# Patient Record
Sex: FEMALE | Race: BLACK OR AFRICAN AMERICAN | Employment: FULL TIME | URBAN - METROPOLITAN AREA
[De-identification: names, ages, dates, MRNs, and addresses within clinical notes are randomized per-mention and may not be internally consistent; named-entity substitution may affect disease eponyms.]

---

## 2019-04-03 ENCOUNTER — HOSPITAL ENCOUNTER (EMERGENCY)
Age: 34
Discharge: HOME | End: 2019-04-03
Attending: EMERGENCY MEDICINE
Payer: SELF-PAY

## 2019-04-03 VITALS
BODY MASS INDEX: 39.37 KG/M2 | DIASTOLIC BLOOD PRESSURE: 92 MMHG | HEIGHT: 66 IN | TEMPERATURE: 98.4 F | RESPIRATION RATE: 18 BRPM | SYSTOLIC BLOOD PRESSURE: 156 MMHG | WEIGHT: 245 LBS | OXYGEN SATURATION: 100 % | HEART RATE: 85 BPM

## 2019-04-03 DIAGNOSIS — Q18.1 CYST OF EAR CANAL: Primary | ICD-10-CM

## 2019-04-03 PROCEDURE — 75810000218 HC FINE NEEDLE ASPIRATION: Performed by: EMERGENCY MEDICINE

## 2019-04-03 PROCEDURE — 99283 EMERGENCY DEPT VISIT LOW MDM: CPT | Performed by: EMERGENCY MEDICINE

## 2019-04-03 PROCEDURE — 75810000139 HC PUNCT ASPIRATION ABCESS: Performed by: EMERGENCY MEDICINE

## 2019-04-03 RX ORDER — TRAMADOL HYDROCHLORIDE 50 MG/1
50 TABLET ORAL
Qty: 18 TAB | Refills: 0 | Status: SHIPPED | OUTPATIENT
Start: 2019-04-03 | End: 2019-04-06

## 2019-04-03 NOTE — ED TRIAGE NOTES
Pt states 4 days ago her hearing started feel muffled. States if you touch on her right outer hear it is tender. States she had tried to clean her ear with some peroxide but it did not help. Denies fever. Denies nausea or vomiting.

## 2019-04-04 NOTE — ED NOTES
I have reviewed discharge instructions with the patient. The patient verbalized understanding. Patient left ED via Discharge Method: ambulatory to Home with mother. Opportunity for questions and clarification provided. Patient given 1 scripts. To continue your aftercare when you leave the hospital, you may receive an automated call from our care team to check in on how you are doing. This is a free service and part of our promise to provide the best care and service to meet your aftercare needs.  If you have questions, or wish to unsubscribe from this service please call [phone]. Thank you for Choosing our [facility] Emergency Department.

## 2019-04-04 NOTE — ED PROVIDER NOTES
Presents with complaint of muffled hearing and pain and a bump in her left ear canal. 
 
The history is provided by the patient. Ear Pain This is a new problem. The current episode started more than 2 days ago. The problem occurs constantly. The problem has been gradually worsening. Patient complains that the left ear is affected. There has been no fever. The pain is moderate. Associated symptoms include hearing loss. Pertinent negatives include no ear discharge, no headaches, no rhinorrhea, no sore throat and no cough. No past medical history on file. No past surgical history on file. No family history on file. Social History Socioeconomic History  Marital status: SINGLE Spouse name: Not on file  Number of children: Not on file  Years of education: Not on file  Highest education level: Not on file Occupational History  Not on file Social Needs  Financial resource strain: Not on file  Food insecurity:  
  Worry: Not on file Inability: Not on file  Transportation needs:  
  Medical: Not on file Non-medical: Not on file Tobacco Use  Smoking status: Never Smoker  Smokeless tobacco: Never Used Substance and Sexual Activity  Alcohol use: No  
 Drug use: No  
 Sexual activity: Yes Lifestyle  Physical activity:  
  Days per week: Not on file Minutes per session: Not on file  Stress: Not on file Relationships  Social connections:  
  Talks on phone: Not on file Gets together: Not on file Attends Shinto service: Not on file Active member of club or organization: Not on file Attends meetings of clubs or organizations: Not on file Relationship status: Not on file  Intimate partner violence:  
  Fear of current or ex partner: Not on file Emotionally abused: Not on file Physically abused: Not on file Forced sexual activity: Not on file Other Topics Concern  Not on file Social History Narrative  Not on file ALLERGIES: Patient has no known allergies. Review of Systems HENT: Positive for ear pain and hearing loss. Negative for ear discharge, rhinorrhea and sore throat. Respiratory: Negative for cough. Neurological: Negative for headaches. All other systems reviewed and are negative. Vitals:  
 04/03/19 1918 04/03/19 2102 BP: (!) 161/100 (!) 156/92 Pulse: 91 85 Resp: 18 18 Temp: 98.2 °F (36.8 °C) 98.4 °F (36.9 °C) SpO2: 100% 100% Weight: 111.1 kg (245 lb) Height: 5' 6\" (1.676 m) Physical Exam  
Constitutional: She is oriented to person, place, and time. She appears well-developed and well-nourished. No distress. HENT:  
Head: Normocephalic and atraumatic. Proximal Left ear canal with a pencil eraser sized cystic mass Musculoskeletal: Normal range of motion. Neurological: She is alert and oriented to person, place, and time. No cranial nerve deficit. Skin: Skin is warm and dry. She is not diaphoretic. Psychiatric: She has a normal mood and affect. Her behavior is normal.  
Nursing note and vitals reviewed. MDM Number of Diagnoses or Management Options Cyst of ear canal:  
Diagnosis management comments: Needle I&D with some drainage, pt tolerated well, warm compresses and refer to ENT. Likely sebaceous cyst.   
 
Risk of Complications, Morbidity, and/or Mortality Presenting problems: low Diagnostic procedures: low Management options: low Patient Progress Patient progress: stable I&D Abcess Simple Date/Time: 4/3/2019 11:26 PM 
Performed by: Cheyenne Tineo MD 
Authorized by: Cheyenne Tineo MD  
 
Consent:  
  Consent obtained:  Verbal 
  Consent given by:  Patient Risks discussed:  Bleeding, incomplete drainage and infection Alternatives discussed:  No treatment Location:  
  Type:  Cyst 
  Size:  5 mm Location:  Head   Head location:  L external auditory canal 
 Pre-procedure details:  
  Skin preparation:  Antiseptic wash Anesthesia (see MAR for exact dosages): Anesthesia method:  None Procedure type:  
  Complexity:  Simple Procedure details:  
  Needle aspiration: yes Needle size:  18 G Drainage:  Purulent and bloody Drainage amount:  Scant Wound treatment:  Wound left open Packing materials:  None Post-procedure details:  
  Patient tolerance of procedure: Tolerated well, no immediate complications

## 2019-04-04 NOTE — DISCHARGE INSTRUCTIONS
Patient Education        Skin Cyst in Children: Care Instructions  Overview    A skin cyst is a lump just under the skin. These cysts can form when a hair follicle becomes blocked. They are common in acne and may occur on the face, neck, back, and genitals. But they can form anywhere on the body. These cysts aren't cancer, and they don't lead to cancer. They tend not to hurt, but they can sometimes become swollen and painful. They also may break open (rupture) and cause scarring. These cysts may not cause problems. They may not need treatment. If your child has a cyst that is swollen and hurts, the doctor may inject it with a medicine or treat it with antibiotics if it's infected. But sometimes a painful or infected cyst will need to be removed or opened. The doctor will give your child a shot of numbing medicine and then will cut into the cyst to drain it or remove it. Follow-up care is a key part of your child's treatment and safety. Be sure to make and go to all appointments, and call your doctor if your child is having problems. It's also a good idea to know your child's test results and keep a list of the medicines your child takes. How can you care for your child at home? · Don't squeeze the skin cyst or poke it with a needle to open it. This can cause swelling, redness, and infection. · Keep the area clean with soap and water. After a cyst is removed  · If your doctor told you how to care for your child's wound, follow your doctor's instructions. If you did not get instructions, follow this general advice for wounds without stitches:  ? Keep the wound bandaged and dry for the first day. ? After the first day, wash around the wound with clean water 2 times a day. Don't use hydrogen peroxide or alcohol, which can slow healing. · If your child has stitches, you may get other instructions. You will have to come back to have the stitches removed. When should you call for help?   Call your doctor now or seek immediate medical care if:    · Your child has signs of infection, such as:  ? Increased pain, swelling, warmth, or redness. ? Red streaks leading from the area. ? Pus draining from the area. ? A fever.    Watch closely for changes in your child's health, and be sure to contact your doctor if:    · The cyst is growing or changing.     · Your child does not get better as expected. Where can you learn more? Go to http://milvia-rocky.info/. Enter W690 in the search box to learn more about \"Skin Cyst in Children: Care Instructions. \"  Current as of: April 17, 2018  Content Version: 11.9  © 9973-9648 [employer]. Care instructions adapted under license by [employer] (which disclaims liability or warranty for this information). If you have questions about a medical condition or this instruction, always ask your healthcare professional. [address] any warranty or liability for your use of this information.

## 2022-11-26 ENCOUNTER — HOSPITAL ENCOUNTER (EMERGENCY)
Age: 37
Discharge: HOME | End: 2022-11-26
Attending: EMERGENCY MEDICINE

## 2022-11-26 VITALS
TEMPERATURE: 99 F | BODY MASS INDEX: 38.57 KG/M2 | HEIGHT: 66 IN | OXYGEN SATURATION: 99 % | SYSTOLIC BLOOD PRESSURE: 187 MMHG | WEIGHT: 240 LBS | DIASTOLIC BLOOD PRESSURE: 121 MMHG | RESPIRATION RATE: 16 BRPM | HEART RATE: 99 BPM

## 2022-11-26 DIAGNOSIS — J02.9 EXUDATIVE PHARYNGITIS: Primary | ICD-10-CM

## 2022-11-26 DIAGNOSIS — J02.9 ACUTE PHARYNGITIS, UNSPECIFIED ETIOLOGY: ICD-10-CM

## 2022-11-26 PROCEDURE — 99283 EMERGENCY DEPT VISIT LOW MDM: CPT | Performed by: EMERGENCY MEDICINE

## 2022-11-26 RX ORDER — AMOXICILLIN 500 MG/1
500 CAPSULE ORAL 3 TIMES DAILY
Qty: 30 CAPSULE | Refills: 0 | Status: SHIPPED | OUTPATIENT
Start: 2022-11-26 | End: 2022-12-06

## 2022-11-26 ASSESSMENT — PAIN DESCRIPTION - LOCATION: LOCATION: THROAT

## 2022-11-26 ASSESSMENT — PAIN DESCRIPTION - DESCRIPTORS: DESCRIPTORS: SORE

## 2022-11-26 ASSESSMENT — PAIN SCALES - GENERAL: PAINLEVEL_OUTOF10: 8

## 2022-11-26 ASSESSMENT — PAIN - FUNCTIONAL ASSESSMENT: PAIN_FUNCTIONAL_ASSESSMENT: 0-10

## 2022-11-26 ASSESSMENT — PAIN DESCRIPTION - PAIN TYPE: TYPE: ACUTE PAIN

## 2022-11-26 NOTE — ED NOTES
I have reviewed discharge instructions with the patient. The patient verbalized understanding. Patient left ED via Discharge Method: ambulatory to Home with self. Opportunity for questions and clarification provided. Patient given 1 scripts. No esign      To continue your aftercare when you leave the hospital, you may receive an automated call from our care team to check in on how you are doing. This is a free service and part of our promise to provide the best care and service to meet your aftercare needs.  If you have questions, or wish to unsubscribe from this service please call [phone]. Thank you for Choosing our [facility] Emergency Department.         Carmen Sidhu RN  11/26/22 [id number]

## 2022-11-26 NOTE — DISCHARGE INSTRUCTIONS
Saline gargles or Chloraseptic. Tylenol or ibuprofen for fever pain as well. Take antibiotic until complete. Recheck next drooling or symptoms not improving 4 to 5 days.

## 2022-11-29 ASSESSMENT — ENCOUNTER SYMPTOMS
VOMITING: 0
TROUBLE SWALLOWING: 0
ABDOMINAL PAIN: 0
SORE THROAT: 1
COUGH: 0
RHINORRHEA: 0
VOICE CHANGE: 0
SHORTNESS OF BREATH: 0

## 2022-11-29 NOTE — ED PROVIDER NOTES
Emergency Department Provider Note                   PCP:                No primary care provider on file. Age: 42 y.o. Sex: female       ICD-10-CM    1. Exudative pharyngitis  J02.9       2. Acute pharyngitis, unspecified etiology  J02.9           DISPOSITION Decision To Discharge 11/26/2022 09:08:49 AM        MDM  Number of Diagnoses or Management Options  Acute pharyngitis, unspecified etiology  Exudative pharyngitis  Diagnosis management comments: Pharyngitis without any airway issues. Treat with antibiotics    Risk of Complications, Morbidity, and/or Mortality  Presenting problems: moderate  Diagnostic procedures: minimal  Management options: low               No orders of the defined types were placed in this encounter. Medications - No data to display    Discharge Medication List as of 11/26/2022  9:18 AM        START taking these medications    Details   amoxicillin (AMOXIL) 500 MG capsule Take 1 capsule by mouth 3 times daily for 10 days, Disp-30 capsule, R-0Print              Radha Goodson is a 42 y.o. female who presents to the Emergency Department with chief complaint of    Chief Complaint   Patient presents with    Pharyngitis      49-year-old female complains of sore throat and white patches. Started up 24 hours ago. Slight fever and chills. No runny nose cough chest pain shortness of breath. Past history negative no significant surgery. Works in     The history is provided by the patient. Review of Systems   Constitutional:  Positive for chills. Negative for fever. HENT:  Positive for sore throat. Negative for congestion, rhinorrhea, trouble swallowing and voice change. Respiratory:  Negative for cough and shortness of breath. Cardiovascular:  Negative for chest pain. Gastrointestinal:  Negative for abdominal pain and vomiting. No past medical history on file. No past surgical history on file. No family history on file. Social History     Socioeconomic History    Marital status: Single         Patient has no known allergies. Discharge Medication List as of 11/26/2022  9:18 AM        CONTINUE these medications which have NOT CHANGED    Details   naproxen (NAPROSYN) 500 MG tablet Take 500 mg by mouth 2 times daily (with meals)Historical Med              Vitals signs and nursing note reviewed. No data found. Physical Exam  Vitals and nursing note reviewed. Constitutional:       Appearance: She is not ill-appearing. HENT:      Head: Normocephalic and atraumatic. Right Ear: Tympanic membrane normal.      Left Ear: Tympanic membrane normal.      Mouth/Throat:      Mouth: Mucous membranes are moist.      Pharynx: Oropharyngeal exudate and posterior oropharyngeal erythema present. No pharyngeal swelling. Tonsils: 1+ on the right. 1+ on the left. Eyes:      Conjunctiva/sclera: Conjunctivae normal.   Pulmonary:      Effort: Pulmonary effort is normal.      Breath sounds: Normal breath sounds. Skin:     General: Skin is warm and dry. Neurological:      Mental Status: She is alert. Procedures    No results found for any visits on 11/26/22. No orders to display                       Voice dictation software was used during the making of this note. This software is not perfect and grammatical and other typographical errors may be present. This note has not been completely proofread for errors.      Jeromy Staton MD  11/29/22 2920